# Patient Record
Sex: FEMALE | Race: WHITE
[De-identification: names, ages, dates, MRNs, and addresses within clinical notes are randomized per-mention and may not be internally consistent; named-entity substitution may affect disease eponyms.]

---

## 2020-05-26 ENCOUNTER — HOSPITAL ENCOUNTER (OUTPATIENT)
Dept: HOSPITAL 62 - OROUT | Age: 55
Discharge: HOME | End: 2020-05-26
Attending: INTERNAL MEDICINE
Payer: COMMERCIAL

## 2020-05-26 VITALS — SYSTOLIC BLOOD PRESSURE: 145 MMHG | DIASTOLIC BLOOD PRESSURE: 74 MMHG

## 2020-05-26 DIAGNOSIS — J45.909: ICD-10-CM

## 2020-05-26 DIAGNOSIS — K29.50: ICD-10-CM

## 2020-05-26 DIAGNOSIS — Z03.818: ICD-10-CM

## 2020-05-26 DIAGNOSIS — K21.9: Primary | ICD-10-CM

## 2020-05-26 DIAGNOSIS — Z79.899: ICD-10-CM

## 2020-05-26 DIAGNOSIS — F17.210: ICD-10-CM

## 2020-05-26 DIAGNOSIS — I10: ICD-10-CM

## 2020-05-26 DIAGNOSIS — D64.9: ICD-10-CM

## 2020-05-26 DIAGNOSIS — K29.80: ICD-10-CM

## 2020-05-26 DIAGNOSIS — K44.9: ICD-10-CM

## 2020-05-26 PROCEDURE — 84132 ASSAY OF SERUM POTASSIUM: CPT

## 2020-05-26 PROCEDURE — 88305 TISSUE EXAM BY PATHOLOGIST: CPT

## 2020-05-26 PROCEDURE — 36415 COLL VENOUS BLD VENIPUNCTURE: CPT

## 2020-05-26 PROCEDURE — 87635 SARS-COV-2 COVID-19 AMP PRB: CPT

## 2020-05-26 PROCEDURE — 43239 EGD BIOPSY SINGLE/MULTIPLE: CPT

## 2020-05-26 NOTE — OPERATIVE REPORT
Operative Report


DATE OF SURGERY: 05/26/20


Operative Report: 





The risks benefits and alternatives of the procedure explained to the patient in

detail and informed consent is obtained.A  GIF Olympus video scope was inserted 

into the patient's mouth and hypopharynx, the esophagus is identified intubated 

and insufflated, the scope was then advanced through the esophagus stomach and 

duodenum, retroflexion maneuver is done, the esophagus stomach and first and 

second portions of the duodenum examined


PREOPERATIVE DIAGNOSIS: Abdominal pain, gastroesophageal reflux disease


POSTOPERATIVE DIAGNOSIS: Gastritis status post biopsy


OPERATION: EGD with biopsy


SURGEON: LUZ VENTURA


ANESTHESIA: LMAC


TISSUE REMOVED OR ALTERED: As noted above.


COMPLICATIONS: 





None.


ESTIMATED BLOOD LOSS: None.


INTRAOPERATIVE FINDINGS: As noted above.


PROCEDURE: 





Patient tolerated the procedure well.


No immediate postprocedure complications are noted.


Patient is discharged in good condition.


Discharge date 5/26/2020.  Discharge diet: Regular.





Discharge activity: Regular.


2 to 3-week follow-up to discuss findings.


Patient is instructed to call the office or proceed to the emergency room should

there be any further problems or questions.


Wait on the pathology.

## 2020-06-17 ENCOUNTER — HOSPITAL ENCOUNTER (OUTPATIENT)
Dept: HOSPITAL 62 - RAD | Age: 55
End: 2020-06-17
Attending: INTERNAL MEDICINE
Payer: COMMERCIAL

## 2020-06-17 DIAGNOSIS — R10.11: Primary | ICD-10-CM

## 2020-06-17 PROCEDURE — 76705 ECHO EXAM OF ABDOMEN: CPT

## 2020-06-17 PROCEDURE — 78227 HEPATOBIL SYST IMAGE W/DRUG: CPT

## 2020-06-17 PROCEDURE — A9537 TC99M MEBROFENIN: HCPCS

## 2020-06-17 NOTE — RADIOLOGY REPORT (SQ)
EXAM DESCRIPTION:  U/S ABDOMEN LIMITED W/O DOP



IMAGES COMPLETED DATE/TIME:  6/17/2020 8:43 am



REASON FOR STUDY:  RUQ PAIN R10.11  RIGHT UPPER QUADRANT PAIN



COMPARISON:  3/4/2019



TECHNIQUE:  Dynamic and static grayscale images acquired of the abdomen and recorded on PACS. Additio
nal selected color Doppler and spectral images recorded.



LIMITATIONS:  None.



FINDINGS:  PANCREAS: No masses.  Visualized pancreatic duct normal caliber.

LIVER:  Fatty liver.  The liver measures 16.4 cm in length, normal size.

LIVER VASCULATURE: Normal directional flow of the main portal vein and hepatic veins.

GALLBLADDER: No stones.  The gallbladder wall measures 2.0 mm, normal wall thickness. No pericholecys
tic fluid.

ULTRASOUND-DETECTED GUERRERO'S SIGN: Negative.

INTRAHEPATIC DUCTS AND COMMON DUCT: CBD measures 5.6 mm in diameter, normal.  The  intrahepatic ducts
 normal caliber. No filling defects.

INFERIOR VENA CAVA: Normal flow.

AORTA: No aneurysm.

RIGHT KIDNEY:  The right kidney measures 10.2 cm in length, normal size. Normal echogenicity. No luis felipe
d or suspicious masses. No hydronephrosis. No calcifications.

PERITONEAL AND RIGHT PLEURAL SPACE: No ascites or effusions.

OTHER: No other significant findings.



IMPRESSION:  1.  Fatty liver.

2.  Examination is otherwise unremarkable sonographically.



TECHNICAL DOCUMENTATION:  JOB ID:  [id number]

 2011 [email or web address]- All Rights Reserved



Reading location - IP/workstation name: [email or web address]

## 2020-06-17 NOTE — RADIOLOGY REPORT (SQ)
EXAM DESCRIPTION:  NM HIDA SCAN WITH CCK



IMAGES COMPLETED DATE/TIME:  6/17/2020 10:44 am



REASON FOR STUDY:  RUQ PAIN R10.11  RIGHT UPPER QUADRANT PAIN



COMPARISON:  None.



RADIONUCLIDE AND DOSE:  DOSAGE RADIONUCLIDE: 5.2 millicuries Tc99m Mebrofenin.

DOSAGE CCK: 1.6 micrograms.

DOSAGE MORPHINE: Not required.

The route of agent administration: Intravenous



TECHNIQUE:  Serial imaging right upper quadrant up to 60 minutes following injection of radionuclide.
  CCK injected after gallbladder visualized.



LIMITATIONS:  None.



FINDINGS:  LIVER: Normal visualization without areas of photopenia.

INTRAHEPATIC BILE DUCTS: Normal size and no delay in visualization.

COMMON BILE DUCT: Normal without dilatation.

GALLBLADDER:   Normal visualization.  Calculated ejection fraction of 4%. Normal range is greater mike
n 35%.

PHYSICAL RESPONSE:  Patients presenting complaint were reproduced.

OTHER: No other significant finding.



IMPRESSION:  1. ABNORMAL STUDY. GALLBLADDER EJECTION FRACTION is 4%(normal range is greater than 35%)
. EVIDENCE FOR BILIARY DYSKINESIS.

2.  The patient's symptoms were reproduced following CCK administration.



TECHNICAL DOCUMENTATION:  JOB ID:  [id number]

 2011 [email or web address]- All Rights Reserved



Reading location - IP/workstation name: [email or web address]